# Patient Record
Sex: MALE | Race: OTHER | Employment: FULL TIME | URBAN - METROPOLITAN AREA
[De-identification: names, ages, dates, MRNs, and addresses within clinical notes are randomized per-mention and may not be internally consistent; named-entity substitution may affect disease eponyms.]

---

## 2017-01-09 ENCOUNTER — APPOINTMENT (OUTPATIENT)
Dept: LAB | Age: 32
End: 2017-01-09
Payer: MEDICAID

## 2017-01-09 DIAGNOSIS — K57.30 DIVERTICULAR DISEASE OF LARGE INTESTINE WITHOUT PERFORATION OR ABSCESS: ICD-10-CM

## 2017-01-09 LAB
BUN BLD-MCNC: 9 MG/DL (ref 8–20)
CALCIUM BLD-MCNC: 9 MG/DL (ref 8.3–10.3)
CHLORIDE: 105 MMOL/L (ref 101–111)
CO2: 31 MMOL/L (ref 22–32)
CREAT BLD-MCNC: 0.81 MG/DL (ref 0.7–1.3)
ERYTHROCYTE [DISTWIDTH] IN BLOOD BY AUTOMATED COUNT: 12.9 % (ref 11.5–16)
GLUCOSE BLD-MCNC: 85 MG/DL (ref 70–99)
HCT VFR BLD AUTO: 44.2 % (ref 37–53)
HGB BLD-MCNC: 15.3 G/DL (ref 13–17)
MCH RBC QN AUTO: 31.3 PG (ref 27–33.2)
MCHC RBC AUTO-ENTMCNC: 34.6 G/DL (ref 31–37)
MCV RBC AUTO: 90.4 FL (ref 80–99)
PLATELET # BLD AUTO: 296 10(3)UL (ref 150–450)
POTASSIUM SERPL-SCNC: 3.9 MMOL/L (ref 3.6–5.1)
RBC # BLD AUTO: 4.89 X10(6)UL (ref 4.3–5.7)
RED CELL DISTRIBUTION WIDTH-SD: 42.2 FL (ref 35.1–46.3)
SODIUM SERPL-SCNC: 138 MMOL/L (ref 136–144)
WBC # BLD AUTO: 7.8 X10(3) UL (ref 4–13)

## 2017-01-09 PROCEDURE — 80048 BASIC METABOLIC PNL TOTAL CA: CPT

## 2017-01-09 PROCEDURE — 36415 COLL VENOUS BLD VENIPUNCTURE: CPT

## 2017-01-09 PROCEDURE — 85027 COMPLETE CBC AUTOMATED: CPT

## 2017-01-20 ENCOUNTER — SURGERY (OUTPATIENT)
Age: 32
End: 2017-01-20

## 2017-01-20 ENCOUNTER — ANESTHESIA EVENT (OUTPATIENT)
Dept: SURGERY | Facility: HOSPITAL | Age: 32
End: 2017-01-20

## 2017-01-20 ENCOUNTER — ANESTHESIA (OUTPATIENT)
Dept: SURGERY | Facility: HOSPITAL | Age: 32
End: 2017-01-20

## 2017-01-20 PROBLEM — K57.30 DIVERTICULAR DISEASE OF LARGE INTESTINE WITHOUT PERFORATION OR ABSCESS: Status: ACTIVE | Noted: 2017-01-20

## 2017-01-20 NOTE — ANESTHESIA POSTPROCEDURE EVALUATION
[facility] FOR SURGERY Patient Status:  Surgery Admit   Age/Gender 33year old male MRN [id number]   [facility] SURGERY Attending Lexy Ellison MD   Hosp Day # 0 PCP None Pcp       Anesthesia Post-op Note    Procedure(s):  LAPAROSC

## 2017-01-20 NOTE — ANESTHESIA PREPROCEDURE EVALUATION
PRE-OP EVALUATION    Patient Name: Sparkle Pack    Pre-op Diagnosis: Diverticulitis of large intestine without perforation or abscess without bleeding [K57.32]    Procedure(s):  LAPAROSCOPIC LOW ANTERIOR RESECTION OF THE COLON    Surgeon(s) and Role:     * ASA: 1   Plan: general  NPO status verified and patient meets guidelines. Post-procedure pain management plan discussed with surgeon and patient.       Plan/risks discussed with: patient            Options, risks and benefits of anesthesia as outlined

## 2017-01-26 ENCOUNTER — OFFICE VISIT (OUTPATIENT)
Dept: SURGERY | Facility: CLINIC | Age: 32
End: 2017-01-26

## 2017-01-26 VITALS
BODY MASS INDEX: 24.25 KG/M2 | HEART RATE: 79 BPM | TEMPERATURE: 99 F | RESPIRATION RATE: 12 BRPM | DIASTOLIC BLOOD PRESSURE: 78 MMHG | SYSTOLIC BLOOD PRESSURE: 116 MMHG | HEIGHT: 68 IN | WEIGHT: 160 LBS

## 2017-01-26 DIAGNOSIS — K57.20 DIVERTICULITIS OF LARGE INTESTINE WITH ABSCESS WITHOUT BLEEDING: Primary | ICD-10-CM

## 2017-01-26 NOTE — PROGRESS NOTES
Post Operative Visit Note       Active Problems  1.  Diverticulitis of large intestine with abscess without bleeding         Chief Complaint   Post-Op     History of Present Illness   This patient is doing very well following Laparoscopic low anterior resec has been reviewed by me during today. Review of Systems   Constitutional: Negative for fever, chills, diaphoresis, fatigue and unexpected weight change. HENT: Negative for hearing loss, nosebleeds, sore throat and trouble swallowing.     Respiratory: Neg Assessment   Diverticulitis of large intestine with abscess without bleeding  (primary encounter diagnosis)    Plan   This patient continues to do very well following laparoscopic low anterior resection of the rectosigmoid colon for diverticulitis.

## 2017-02-10 ENCOUNTER — TELEPHONE (OUTPATIENT)
Dept: SURGERY | Facility: CLINIC | Age: 32
End: 2017-02-10

## 2017-02-10 NOTE — TELEPHONE ENCOUNTER
COLON RESECTION 2 WKS AGO, FOR PAST 2 DAYS BEEN GETTING SHARP SHOOTING PAIN 10-20 TIMES PER DAY, DENIES FEVER, TAKING NORCO AND THIS HELPS WITH THE PAIN, BM ARE NORMAL, VOIDING FINE.  SAME PAIN AS WHEN HE HAD DIVERTICULITIS BUT NOW THE PAIN IS ON THE OPPOSI

## 2017-02-14 ENCOUNTER — OFFICE VISIT (OUTPATIENT)
Dept: SURGERY | Facility: CLINIC | Age: 32
End: 2017-02-14

## 2017-02-14 VITALS
DIASTOLIC BLOOD PRESSURE: 85 MMHG | BODY MASS INDEX: 24.25 KG/M2 | HEART RATE: 84 BPM | HEIGHT: 68 IN | SYSTOLIC BLOOD PRESSURE: 125 MMHG | RESPIRATION RATE: 16 BRPM | WEIGHT: 160 LBS

## 2017-02-14 DIAGNOSIS — K57.20 DIVERTICULITIS OF LARGE INTESTINE WITH ABSCESS WITHOUT BLEEDING: Primary | ICD-10-CM

## 2017-02-14 PROCEDURE — 99024 POSTOP FOLLOW-UP VISIT: CPT | Performed by: SURGERY

## 2017-02-14 NOTE — PROGRESS NOTES
GENERAL SURGERY    Sergey Florian MD, FACS, Tracie Zavala. Anne Wayne MD, Toma Boggs MD, FACS  [facility] [employer].  Yoon Ocampo MD, Cassidy Pérez MD, AIDEE Cote PA-C         [address] [address]

## 2017-03-14 ENCOUNTER — OFFICE VISIT (OUTPATIENT)
Dept: SURGERY | Facility: CLINIC | Age: 32
End: 2017-03-14

## 2017-03-14 VITALS
SYSTOLIC BLOOD PRESSURE: 118 MMHG | HEART RATE: 78 BPM | BODY MASS INDEX: 23.7 KG/M2 | HEIGHT: 69 IN | WEIGHT: 160 LBS | DIASTOLIC BLOOD PRESSURE: 81 MMHG | RESPIRATION RATE: 16 BRPM

## 2017-03-14 DIAGNOSIS — K57.20 DIVERTICULITIS OF LARGE INTESTINE WITH ABSCESS WITHOUT BLEEDING: Primary | ICD-10-CM

## 2017-03-14 DIAGNOSIS — Z90.49 S/P PARTIAL RESECTION OF COLON: ICD-10-CM

## 2017-03-14 PROCEDURE — 99024 POSTOP FOLLOW-UP VISIT: CPT | Performed by: SURGERY

## 2017-03-14 NOTE — PROGRESS NOTES
Follow Up Visit Note       Active Problems      No diagnosis found.       Chief Complaint   Patient presents with:  Colon Problem: S/P LAPAROSCOPIC LOW ANTERIOR RESECTION OF THE COLON on 1/20 @        History of Present Illness        Allergies  Teresa Rob has Genitourinary: Negative for dysuria, urgency, frequency and difficulty urinating. Musculoskeletal: Negative for myalgias and arthralgias. Skin: Negative for color change and rash. Neurological: Negative for tremors, syncope and weakness.    Hematolo

## 2017-03-14 NOTE — PROGRESS NOTES
GENERAL SURGERY    Cristian Garza MD, FACS, Katharina German. Ailin Hernandez MD, Keiry Bustamante MD, FACS  Esteban Hawthorne.  Kate Scott MD, Romina Ricci MD, AIDEE Cote PA-C         [address] [address]

## (undated) NOTE — MR AVS SNAPSHOT
[facility]  [address] [address] [phone]               Thank you for choosing us for your health care visit with Mary Calderon MD.  We are glad to serve you and happy to provide you with this summary of y

## (undated) NOTE — MR AVS SNAPSHOT
[facility]  [address]  [facility] [address] [address] [phone]               Thank you for choosing us for your health care visit with Perla Perez MD.  We are glad to serve you and happy to provide you with this summary of y